# Patient Record
Sex: FEMALE | Race: WHITE | NOT HISPANIC OR LATINO | Employment: FULL TIME | URBAN - METROPOLITAN AREA
[De-identification: names, ages, dates, MRNs, and addresses within clinical notes are randomized per-mention and may not be internally consistent; named-entity substitution may affect disease eponyms.]

---

## 2023-01-19 ENCOUNTER — TELEPHONE (OUTPATIENT)
Dept: HEMATOLOGY/ONCOLOGY | Facility: CLINIC | Age: 48
End: 2023-01-19
Payer: COMMERCIAL

## 2023-01-19 NOTE — TELEPHONE ENCOUNTER
Called and scheduled consult for Friday, March 17th for 11:30am. She voiced thanks and understanding. No questions at this time. Appt added to wait list. Appt letter and copy of new pt questionnaire sent to pt via the mail.    ----- Message from Bharat Patel sent at 1/18/2023  3:06 PM CST -----  Good afternoon,    The pt listed above is being referred from [facility] for (history of stomach cancer in mother/recent removal of 25 mm colon ploy/new dx of ic). I have scanned the referral and records into . Please advise or contact pt to schedule appt at your earliest convenience.    Thank You,    Bharat Patel  [facility]

## 2023-03-13 ENCOUNTER — PATIENT MESSAGE (OUTPATIENT)
Dept: HEMATOLOGY/ONCOLOGY | Facility: CLINIC | Age: 48
End: 2023-03-13
Payer: COMMERCIAL

## 2023-03-16 ENCOUNTER — TELEPHONE (OUTPATIENT)
Dept: HEMATOLOGY/ONCOLOGY | Facility: CLINIC | Age: 48
End: 2023-03-16
Payer: COMMERCIAL

## 2023-03-16 NOTE — TELEPHONE ENCOUNTER
Attempted to call and confirm genetic appt for 3/17 and to possibly offer to switch to a virtual consult with Iftikhar Gilbert since pt lives in [place]. Phone went straight to , left a brief message with my direct call back number.

## 2023-03-16 NOTE — PROGRESS NOTES
"Cancer Genetics  Hereditary and High-Risk Clinic  Department of Hematology and Oncology  [facility]    [facility]    Date of Service:  3/17/23  Visit Provider:  Celio Li DNP  Collaborating Physician:  Hayley Garcia MD    Patient ID  Name: Madeline Colmenares    : 1975    MRN: [id number]      Referring Provider  BOBBY Duncan  [facility]  [address] [place] [address] [address], [place] [address]  Phone:  [phone]  Fax:  [phone]    SUBJECTIVE      Chief Complaint: Genetic Evaluation    History of Present Illness (HPI):  Madeline Colmenares ("Madeline"), 48 y.o., assigned female sex at birth, is new to the [facility] Department of Hematology and Oncology and to me.  She was referred by BOBBY Duncan, with [facility] in [place], [place], for cancer-genetic risk assessment given her recent removal of a 25-mm colon polyp and family history of cancer.    Focused Medical History  Genetic testing:  No  Cancer:  No  Colon polyp:  Yes (has had approx 5-7 colonoscopies in total)  No polyps on colonoscopies prior to 2015 colonoscopy  2015:  No polyps  2022:  25-mm, pedunculated, tubulovillous adenoma in the distal sigmoid colon, s/p resection and retrieval  2022:  Diminutive, sessile, hyperplastic polyp in the sigmoid colon (separate from tattooed spot)  No evidence of the previous polyp  Other pertinent lesion:  No  Pancreatitis or pancreatic cyst:  No  Reproductive organs:  s/p left salpingo-oophorectomy (ovarian cysts), uterus and right ovary intact  Blood disorder:  No    Breast Cancer Risk Assessment Questionnaire  Age:  48 y.o.   Race and ethnicity:  White, No /Latin ethnicity  Weight:  170 lb  Height:  5'6"  Mammographic breast density:  heterogeneously dense   Age at menarche:  15y  Age at first live childbirth:  28y  Menopausal status:  premenopausal  Age at menopause, if applicable:  n/a  Hormone replacement therapy use history:  " previous testosterone use only (shots, pellets)  Breast biopsy history and findings:  never  Thoracic radiation therapy history:  never  Breast cancer susceptibility gene testing history:  See above  [employer] ancestry:  See below  Family history of breast cancer, ovarian cancer, and genetic testing:  See below    Most recent mammogram:  3/2/23 (outside facility):  BI-RADS1, heterogeneously dense breast tissue     Ancestry  Ash[employer] Church:  No    Family Oncologic History  ** The pedigree below was placed into this note in a size that produced a legible font.  If it is appearing small/illegible on your screen, expand this note window horizontally. **    Consanguinity:  No  Hereditary cancer genetic testing in blood relatives:  No  Other than noted, no known history of cancer in relatives depicted in the pedigree or in:  maternal first cousins, maternal extended family, paternal first cousins, paternal extended family.  Other than noted, no known family history of benign tumors or of colon polyps.    Review of Systems  See HPI.    Patient's Distress Score today was 8-9/10 (scale of 0-10 on which 10=worst).  Patient attributes this to health and work ().  Patient denies experiencing suicidal or homicidal ideations (SI/HI).  Patient denies being in any danger.  Offered patient a referral to Behavioral Health, and patient declined.   Pain of 2/10 is not so much pain as it is numbness and tingling.  States this is not painful.  This is to her right leg -- knee to foot -- and to her left leg to a lesser degree of severity and mainly impacting the ankle on that side.  Started since flying to [place] (she just returned from [place] today at 2 AM).  Can fully ambulate, but favors left leg.  Denies swelling of the lower extremities.  Review of stroke symptoms negative:  Denies blurred vision, headache, body weakness, and other neurologic symptoms.  Both arms were tingling, as well, last night -- right > left.   Denies chest pain and shortness of breath.  (Pain is rated on scale of 0-10 on which 10=worst.)     OBJECTIVE     Physical Exam  Very pleasant patient.  Unaccompanied.  Vitals signs:  Reviewed:  Vitals:    03/17/23 1140   BP: 113/78   Pulse: 63   PainSc:   2   PainLoc: Leg   Constitutional      Appearance:  Appears well developed and well nourished. No distress.   Cardiovascular     Heart sounds:  S1 and S2 auscultated.     Lower extremities:  Bilateral DP pulses palpated.  No swelling noted in the lower legs or in the ankles.  Numbness reported on palpation:  Right shin > Right posterior calf > Left lower extremity.  No pain to right lower leg or left lower leg on palpation.  Pulmonary     Effort:  Normal.     Breath sounds:  No abnormality auscultated.  Neurological     Mental Status:  Alert and oriented.     Coordination:  Normal.   Psychiatric         Mood and Affect:  Normal.     Thought Content:  Normal.     Speech:  Normal.     Behavior:  Normal.     Judgment:  Normal.  Genetics-specific     It is my assessment that the patient is ready to proceed with cancer genetic testing from a psychosocial perspective.    CANCER GENETIC COUNSELING      Cancer Genetics     Germline cancer genetic testing is the testing of genes associated with cancer, known as cancer susceptibility genes.  Just as these genes are inherited from parents--one copy of each gene from each parent--mutations in these genes can be inherited, as well.  A mutation in a cancer susceptibility gene adversely affects the gene's ability to prevent cancer; therefore, carriers of cancer susceptibility gene mutations may be at increased risk for certain cancers.     Causes of Cancer    Only approximately 5%-10% of cancers are caused by an inherited cancer susceptibility gene mutation; rather, the majority of cancers are sporadic.  Causes of sporadic cancers may include environmental risk factors, lifestyle risk factors, and non-modifiable risk factors.   It is important to note that members of a family often share not only their genetics but also other risk factors, including environmental and lifestyle risk factors, so cancers can be familial.     Potential Results of Genetic Testing, and Their Implications     Potential results of genetic testing include positive, negative, and variant of unknown significance (VUS).    Positive:  A positive result indicates the presence of at least one clinically significant gene mutation, and the individual's associated cancer risks vary depending upon the cancer susceptibility gene(s) in which there is/are a mutation(s).  With a positive result, depending upon the specific result and the individual's clinical history, modified risk management may be recommended, including measures for risk reduction and/or surveillance; however, there are not always effective strategies for modified risk management.  Negative:  A negative result indicates that no clinically significant mutations were identified in the gene(s) tested.  The ability to interpret the meaning of a negative genetic testing result pertaining to a certain condition is limited in an individual unaffected with that condition -- With regard to colon polyps, Madeline is considered affected, and with regard to other, malignant conditions, she is not.  A negative result in the patient does not indicate that she cannot develop cancer, and, in fact, she may even be at increased risk for cancer based on shared risk factors with affected relatives.   VUS:  A VUS indicates that there is not presently enough data for the laboratory to make a determination as to whether the genetic variant is clinically significant.  VUSs are not typically acted upon clinically.       Genetic Mutation Inheritance     When an individual tests positive for a gene mutation, her first-degree relatives each have a 50% chance of carrying the same mutation, and other, more distant blood relatives can also be  at risk of carrying the same mutation.      Psychological Implication    There is potential for psychological effects related to learning of increased susceptibility to cancer.    Genetic Discrimination     Genetic discrimination occurs when individuals are discriminated against on the basis of their genetic information.    The Genetic Information Nondiscrimination Act of 2008 (ALLYSON) is [place] federal legislation that provides some protections against use of an individual's genetic information by their health insurer and by their employer.      Title I of [place] prohibits most health insurers from utilizing an individual's genetic information to make decisions regarding insurance eligibility or premium charges.  This protection does not apply to health insurance obtained through a job with the [employer], and it is unclear whether it applies to health insurance obtained through the Federal Employees Health Benefits Plan.    Title II of ALL[place]ON prohibits covered entities, in many cases, from requesting or requiring the genetic information of employees and applicants and from using said information to make employment decisions.  This protection does not apply to employers with fewer than 15 employees or to the .    ALL[place]ON does not protect individuals from genetic discrimination by any other type of policy or entity, including but not limited to life insurance, disability insurance, long-term care insurance,  benefits, and [facility] benefits.    Genetic Testing Logistics     An outside laboratory would perform the testing after a blood sample or a skin sample is collected here at the [facility].    There is a potential for the patient to incur out-of-pocket costs related to genetic testing.    One can expect this genetic testing to take approximately three weeks to result.    Post-test genetic counseling can be conducted once the genetic testing results are available.     Cancer Genetics  "Impression    Offered Madeline options of proceeding with hereditary cancer susceptibility gene testing at this time versus delaying/declining such.  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  She has not been worked up for such.  Discussed implications of mastocytosis, with regard to germline genetic testing, with Madeline, and together we concluded that she would first meet with a hematology provider near home (home = [place]) (offered referral to Hematology here at [facility]) to get their opinion on likelihood of mastocytosis and need for workup for mastocytosis.  If the hematologist deems mastocytosis unlikely, we may then proceed with blood sample collection for germline testing; on the other hand, if the hematologist deems further workup for mastocytosis is indicated, will await those results prior to determining most appropriate specimen for germline testing.    Obtaining DVT study for bilat lower legs.    Will send breast cancer risk scores via [facility] to patient after visit.    ASSESSMENT / PLAN        ICD-10-CM ICD-9-CM   1. Encounter for nonprocreative genetic counseling  Z71.83 V26.33   2. Tubulovillous adenoma of colon  D12.6 211.3   3. Family history of colonic polyps  Z83.71 V18.51   4. Numbness and tingling of both legs  R20.0 782.0    R20.2      1. Encounter for nonprocreative genetic counseling  Madeline Colmenares ("Madeline"), 48 y.o., assigned female sex at birth, is new to the [facility] Department of H[facility]ology and Oncology and to me.  She was referred by BOBBY Duncan, with [facility] in [place], [place], for cancer-genetic risk assessment given her recent removal of a 25-mm colon polyp and family history of cancer.  Cancer genetic risk assessment and pre-test cancer genetic counseling were conducted.  Offered Madeline options of " proceeding with hereditary cancer susceptibility gene testing at this time versus delaying/declining such.  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  She has not been worked up for such.  Discussed implications of mastocytosis, with regard to germline genetic testing, with Madeline, and together we concluded that she would first meet with a hematology provider near home (home = [place]) (offered referral to Hematology here at [facility]) to get their opinion on likelihood of mastocytosis and need for workup for mastocytosis.  If the hematologist deems mastocytosis unlikely, we may then proceed with blood sample collection for germline testing; on the other hand, if the hematologist deems further workup for mastocytosis is indicated, will await those results prior to determining most appropriate specimen for germline testing.      2. Tubulovillous adenoma of colon  - See # 1    3. Family history of colonic polyps  - See # 1    4. Numbness and tingling of both legs  - US Lower Extremity Veins Bilateral; Future     Breast Cancer Risk Stratification:  Current, Estimated Breast Cancer Risk Model Used Patient's Score Patient's Risk Category   5-year Starr Model 0.9%  [] N/A given age <35   [x] Average risk (<1.7%)   [] Increased risk (?1.7%)   10-year Tyrer-Cuzick v8.0b 2.6%  [x] <5%   [] ?5% (Chemoprevention recommended if not otherwise contraindicated)   Lifetime (to age 85) Tyrer-Cuzick v8.0b 10.9%  [x] Average risk (<15%)   [] Intermediate risk (?15% - <20%)   [] Increased risk (?20%)     Follow-up:  Follow up in about 1 year (around 3/17/2024) for re-discussion regarding genetic testing, if the patient does not reach out sooner to test.    Questions were encouraged and answered to the patient's satisfaction, and she verbalized understanding of the information and  agreement with the plan.           Approximately 80 minutes were spent face-to-face with the patient.  Approximately 88 minutes in total were spent on this encounter, which includes face-to-face time and non-face-to-face time preparing to see the patient (e.g., review of tests), obtaining and/or reviewing separately obtained history, documenting clinical information in the electronic or other health record, independently interpreting results (not separately reported) and communicating results to the patient/family/caregiver, or care coordination (not separately reported).         Celio Li, TANYA, APRN, FNP-BC, AOCNP, CGRA  Nurse Practitioner, Hereditary and High-Risk Clinic  Department of Hematology and Oncology  [facility]        CC:  JOSUE DuncanC  [facility]  [address], [place] [address]  Phone:  [phone]  Fax:  [phone]

## 2023-03-17 ENCOUNTER — PATIENT MESSAGE (OUTPATIENT)
Dept: HEMATOLOGY/ONCOLOGY | Facility: CLINIC | Age: 48
End: 2023-03-17

## 2023-03-17 ENCOUNTER — OFFICE VISIT (OUTPATIENT)
Dept: HEMATOLOGY/ONCOLOGY | Facility: CLINIC | Age: 48
End: 2023-03-17
Payer: COMMERCIAL

## 2023-03-17 ENCOUNTER — HOSPITAL ENCOUNTER (OUTPATIENT)
Dept: RADIOLOGY | Facility: HOSPITAL | Age: 48
Discharge: HOME | End: 2023-03-17
Attending: NURSE PRACTITIONER
Payer: COMMERCIAL

## 2023-03-17 VITALS — SYSTOLIC BLOOD PRESSURE: 113 MMHG | HEART RATE: 63 BPM | DIASTOLIC BLOOD PRESSURE: 78 MMHG

## 2023-03-17 DIAGNOSIS — Z71.83 ENCOUNTER FOR NONPROCREATIVE GENETIC COUNSELING: Primary | ICD-10-CM

## 2023-03-17 DIAGNOSIS — R20.0 NUMBNESS AND TINGLING OF BOTH LEGS: ICD-10-CM

## 2023-03-17 DIAGNOSIS — D12.6 TUBULOVILLOUS ADENOMA OF COLON: ICD-10-CM

## 2023-03-17 DIAGNOSIS — Z83.719 FAMILY HISTORY OF COLONIC POLYPS: ICD-10-CM

## 2023-03-17 DIAGNOSIS — R20.2 NUMBNESS AND TINGLING OF BOTH LEGS: ICD-10-CM

## 2023-03-17 PROBLEM — J32.9 RECURRENT SINUS INFECTIONS: Status: ACTIVE | Noted: 2023-03-17

## 2023-03-17 PROBLEM — N30.10 INTERSTITIAL CYSTITIS: Status: ACTIVE | Noted: 2023-03-17

## 2023-03-17 PROBLEM — E06.3 HASHIMOTO'S THYROIDITIS: Status: ACTIVE | Noted: 2023-03-17

## 2023-03-17 PROCEDURE — 99205 PR OFFICE/OUTPT VISIT, NEW, LEVL V, 60-74 MIN: ICD-10-PCS | Mod: S$GLB,,, | Performed by: NURSE PRACTITIONER

## 2023-03-17 PROCEDURE — 1159F MED LIST DOCD IN RCRD: CPT | Mod: CPTII,S$GLB,, | Performed by: NURSE PRACTITIONER

## 2023-03-17 PROCEDURE — 3078F PR MOST RECENT DIASTOLIC BLOOD PRESSURE < 80 MM HG: ICD-10-PCS | Mod: CPTII,S$GLB,, | Performed by: NURSE PRACTITIONER

## 2023-03-17 PROCEDURE — 93970 EXTREMITY STUDY: CPT | Mod: 26,,, | Performed by: STUDENT IN AN ORGANIZED HEALTH CARE EDUCATION/TRAINING PROGRAM

## 2023-03-17 PROCEDURE — 99999 PR PBB SHADOW E&M-EST. PATIENT-LVL III: CPT | Mod: PBBFAC,,, | Performed by: NURSE PRACTITIONER

## 2023-03-17 PROCEDURE — 99205 OFFICE O/P NEW HI 60 MIN: CPT | Mod: S$GLB,,, | Performed by: NURSE PRACTITIONER

## 2023-03-17 PROCEDURE — 3074F SYST BP LT 130 MM HG: CPT | Mod: CPTII,S$GLB,, | Performed by: NURSE PRACTITIONER

## 2023-03-17 PROCEDURE — 93970 EXTREMITY STUDY: CPT | Mod: TC

## 2023-03-17 PROCEDURE — 3074F PR MOST RECENT SYSTOLIC BLOOD PRESSURE < 130 MM HG: ICD-10-PCS | Mod: CPTII,S$GLB,, | Performed by: NURSE PRACTITIONER

## 2023-03-17 PROCEDURE — 99999 PR PBB SHADOW E&M-EST. PATIENT-LVL III: ICD-10-PCS | Mod: PBBFAC,,, | Performed by: NURSE PRACTITIONER

## 2023-03-17 PROCEDURE — 1159F PR MEDICATION LIST DOCUMENTED IN MEDICAL RECORD: ICD-10-PCS | Mod: CPTII,S$GLB,, | Performed by: NURSE PRACTITIONER

## 2023-03-17 PROCEDURE — 3078F DIAST BP <80 MM HG: CPT | Mod: CPTII,S$GLB,, | Performed by: NURSE PRACTITIONER

## 2023-03-17 PROCEDURE — 93970 US LOWER EXTREMITY VEINS BILATERAL: ICD-10-PCS | Mod: 26,,, | Performed by: STUDENT IN AN ORGANIZED HEALTH CARE EDUCATION/TRAINING PROGRAM

## 2023-03-17 RX ORDER — LEVOTHYROXINE, LIOTHYRONINE 38; 9 UG/1; UG/1
60 TABLET ORAL EVERY MORNING
COMMUNITY
Start: 2023-03-03 | End: 2023-09-21

## 2023-03-17 RX ORDER — LEVOTHYROXINE, LIOTHYRONINE 57; 13.5 UG/1; UG/1
TABLET ORAL
COMMUNITY
Start: 2023-02-28 | End: 2023-09-21

## 2023-03-17 RX ORDER — HYDROXYZINE PAMOATE 25 MG/1
25 CAPSULE ORAL 2 TIMES DAILY
COMMUNITY
Start: 2023-02-27

## 2023-03-19 ENCOUNTER — PATIENT MESSAGE (OUTPATIENT)
Dept: HEMATOLOGY/ONCOLOGY | Facility: CLINIC | Age: 48
End: 2023-03-19
Payer: COMMERCIAL

## 2023-03-19 DIAGNOSIS — R20.0 NUMBNESS AND TINGLING OF LOWER EXTREMITY: Primary | ICD-10-CM

## 2023-03-19 DIAGNOSIS — R20.2 NUMBNESS AND TINGLING OF LOWER EXTREMITY: Primary | ICD-10-CM

## 2023-03-21 ENCOUNTER — DOCUMENTATION ONLY (OUTPATIENT)
Dept: HEMATOLOGY/ONCOLOGY | Facility: CLINIC | Age: 48
End: 2023-03-21
Payer: COMMERCIAL

## 2023-03-22 ENCOUNTER — PATIENT MESSAGE (OUTPATIENT)
Dept: HEMATOLOGY/ONCOLOGY | Facility: CLINIC | Age: 48
End: 2023-03-22
Payer: COMMERCIAL

## 2023-03-22 PROBLEM — D72.829 ELEVATED WBCS: Status: ACTIVE | Noted: 2023-03-22

## 2023-03-23 ENCOUNTER — PATIENT MESSAGE (OUTPATIENT)
Dept: HEMATOLOGY/ONCOLOGY | Facility: CLINIC | Age: 48
End: 2023-03-23
Payer: COMMERCIAL

## 2023-03-23 PROBLEM — R20.0 NUMBNESS AND TINGLING OF BOTH LEGS: Status: ACTIVE | Noted: 2023-03-23

## 2023-03-23 PROBLEM — D12.6 TUBULOVILLOUS ADENOMA OF COLON: Status: ACTIVE | Noted: 2023-03-23

## 2023-03-23 PROBLEM — R20.2 NUMBNESS AND TINGLING OF BOTH LEGS: Status: ACTIVE | Noted: 2023-03-23

## 2023-03-23 NOTE — TELEPHONE ENCOUNTER
Neurology referral sent to [facility] per request.  
hard copy, drawn during this pregnancy

## 2023-03-27 ENCOUNTER — TELEPHONE (OUTPATIENT)
Dept: HEMATOLOGY/ONCOLOGY | Facility: CLINIC | Age: 48
End: 2023-03-27
Payer: COMMERCIAL

## 2023-03-27 ENCOUNTER — PATIENT MESSAGE (OUTPATIENT)
Dept: HEMATOLOGY/ONCOLOGY | Facility: CLINIC | Age: 48
End: 2023-03-27
Payer: COMMERCIAL

## 2023-03-27 NOTE — TELEPHONE ENCOUNTER
Discussed symptomatology, including today's updates from patient, with collaborating physician Dr. Garcia, who agrees with me that pt should go to ER given symptoms and no recent serum electrolyte levels found in Care Everywhere.  Dr. Garcia also adds that pt may need an MRI.  Phoned pt to communicate this information to her; however, no answer.  LVM for pt, letting her know to check her [facility] message as I would be communicating the information I was calling about through there, and asked her to respond to me there.

## 2023-04-03 ENCOUNTER — PATIENT MESSAGE (OUTPATIENT)
Dept: HEMATOLOGY/ONCOLOGY | Facility: CLINIC | Age: 48
End: 2023-04-03
Payer: COMMERCIAL

## 2023-04-03 PROBLEM — T14.8XXA: Status: ACTIVE | Noted: 2023-03-31

## 2023-04-03 PROBLEM — R79.89 ELEVATED SERUM HCG: Status: ACTIVE | Noted: 2023-03-29

## 2023-04-03 PROBLEM — G57.00 COMPRESSION OF COMMON PERONEAL NERVE: Status: ACTIVE | Noted: 2023-03-31

## 2023-04-04 PROBLEM — R79.89 LOW SERUM CREATININE: Status: ACTIVE | Noted: 2023-04-04

## 2023-04-04 PROBLEM — R79.89 ABNORMAL BUN-TO-CREATININE RATIO: Status: ACTIVE | Noted: 2023-04-04

## 2023-04-04 PROBLEM — R74.01 ELEVATED ALANINE AMINOTRANSFERASE (ALT) LEVEL: Status: ACTIVE | Noted: 2023-04-04

## 2023-04-18 ENCOUNTER — PATIENT MESSAGE (OUTPATIENT)
Dept: HEMATOLOGY/ONCOLOGY | Facility: CLINIC | Age: 48
End: 2023-04-18
Payer: COMMERCIAL

## 2023-04-20 ENCOUNTER — PATIENT MESSAGE (OUTPATIENT)
Dept: HEMATOLOGY/ONCOLOGY | Facility: CLINIC | Age: 48
End: 2023-04-20
Payer: COMMERCIAL

## 2023-04-20 DIAGNOSIS — Z80.49 FAMILY HISTORY OF UTERINE CANCER: ICD-10-CM

## 2023-04-20 DIAGNOSIS — Z80.9 FAMILY HISTORY OF CANCER: ICD-10-CM

## 2023-04-20 DIAGNOSIS — D12.6 TUBULOVILLOUS ADENOMA OF COLON: Primary | ICD-10-CM

## 2023-04-20 DIAGNOSIS — Z83.719 FAMILY HISTORY OF COLONIC POLYPS: ICD-10-CM

## 2023-05-01 ENCOUNTER — PATIENT MESSAGE (OUTPATIENT)
Dept: HEMATOLOGY/ONCOLOGY | Facility: CLINIC | Age: 48
End: 2023-05-01
Payer: COMMERCIAL

## 2023-06-01 ENCOUNTER — PATIENT MESSAGE (OUTPATIENT)
Dept: HEMATOLOGY/ONCOLOGY | Facility: CLINIC | Age: 48
End: 2023-06-01
Payer: COMMERCIAL

## 2023-06-05 ENCOUNTER — TELEPHONE (OUTPATIENT)
Dept: ENDOSCOPY | Facility: HOSPITAL | Age: 48
End: 2023-06-05
Payer: COMMERCIAL

## 2023-06-05 ENCOUNTER — OFFICE VISIT (OUTPATIENT)
Dept: HEMATOLOGY/ONCOLOGY | Facility: CLINIC | Age: 48
End: 2023-06-05
Payer: COMMERCIAL

## 2023-06-05 ENCOUNTER — PATIENT MESSAGE (OUTPATIENT)
Dept: HEMATOLOGY/ONCOLOGY | Facility: CLINIC | Age: 48
End: 2023-06-05

## 2023-06-05 ENCOUNTER — PATIENT MESSAGE (OUTPATIENT)
Dept: HEMATOLOGY/ONCOLOGY | Facility: CLINIC | Age: 48
End: 2023-06-05
Payer: COMMERCIAL

## 2023-06-05 ENCOUNTER — PATIENT MESSAGE (OUTPATIENT)
Dept: ENDOSCOPY | Facility: HOSPITAL | Age: 48
End: 2023-06-05
Payer: COMMERCIAL

## 2023-06-05 DIAGNOSIS — Q99.8: Chronic | ICD-10-CM

## 2023-06-05 DIAGNOSIS — Z71.83 ENCOUNTER FOR NONPROCREATIVE GENETIC COUNSELING: Primary | ICD-10-CM

## 2023-06-05 DIAGNOSIS — Z86.010 HISTORY OF COLON POLYPS: Primary | ICD-10-CM

## 2023-06-05 PROCEDURE — 99215 OFFICE O/P EST HI 40 MIN: CPT | Mod: 95,,, | Performed by: NURSE PRACTITIONER

## 2023-06-05 PROCEDURE — 99215 PR OFFICE/OUTPT VISIT, EST, LEVL V, 40-54 MIN: ICD-10-PCS | Mod: 95,,, | Performed by: NURSE PRACTITIONER

## 2023-06-05 NOTE — TELEPHONE ENCOUNTER
Reference:  National Comprehensive Cancer Network (NCCN). (2023). Colorectal cancer screening. NCCN Clinical Practice Guidelines in Oncology (NCCN Guidelines), Version 1.2023.

## 2023-06-05 NOTE — PROGRESS NOTES
"Cancer Genetics  Hereditary and High-Risk Clinic  Department of Hematology and Oncology  [facility]    Date of Service:  23  Visit Provider:  Celio Li DNP  Collaborating Physician:  Hayley Garcia MD    Patient ID  Name: Madeline Colmenares    : 1975    MRN: [id number]      Televisit Information  The patient location is: [place], [place].    The chief complaint leading to consultation is:  As below.    Visit type: audiovisual.    Face-to-face time with patient:  Approximately 32 minutes.    Approximately 60 minutes in total were spent on this encounter, which includes face-to-face time and non-face-to-face time preparing to see the patient (e.g., review of tests), obtaining and/or reviewing separately obtained history, documenting clinical information in the electronic or other health record, independently interpreting results (not separately reported) and communicating results to the patient/family/caregiver, or care coordination (not separately reported).  Each patient to whom he or she provides medical services by telemedicine is:  (1) informed of the relationship between the physician and patient and the respective role of any other health care provider with respect to management of the patient; and (2) notified that he or she may decline to receive medical services by telemedicine and may withdraw from such care at any time.  Notes:  As below.    SUBJECTIVE      Chief Complaint: Results    History of Present Illness (HPI):  Madeline Colmenares ("Madeline"), 48 y.o., assigned female sex at birth, initially presented on 3/17/23 for cancer-genetic risk assessment and subsequently underwent germline (ie, hereditary) cancer-genetic testing.  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  She returns today for post-test genetic counseling.    Germline (Hereditary) Cancer Susceptibility Gene Testing  See partial report in Assessment/Plan below.  " "Complete report will be Epic.    Focused Medical History  Cancer:  No  Colon polyp:  Yes (has had approx 5-7 colonoscopies in total)  No polyps on colonoscopies prior to 09/16/2015 colonoscopy  09/16/2015:  No polyps  09/26/2022:  25-mm, pedunculated, tubulovillous adenoma in the distal sigmoid colon, s/p resection and retrieval  11/16/2022:  Diminutive, sessile, hyperplastic polyp in the sigmoid colon (separate from tattooed spot)  No evidence of the previous polyp     Other pertinent lesion:  No  Pancreatitis or pancreatic cyst:  No; however, while in college patient had had pain for several months and therefore underwent testing, which she believes included testing of her pancreas, but a solid diagnosis was not obtained, and then the pain just resolved  Reproductive organs:  s/p left salpingo-oophorectomy (ovarian cysts), uterus and right ovary intact  Blood disorder:  No (tested negative for mastocytosis recently)    Patient states she is planning for 11/2023 colonoscopy with her established GI provider.    Breast Cancer Risk Assessment Questionnaire  Age:  48 y.o.   Race and ethnicity:  White, No /Latin ethnicity  Weight:  170 lb  Height:  5'6"  Mammographic breast density:  heterogeneously dense   Age at menarche:  15y  Age at first live childbirth:  28y  Menopausal status:  premenopausal  Age at menopause, if applicable:  n/a  Hormone replacement therapy use history:  previous testosterone use only (shots, pellets)  Breast biopsy history and findings:  never  Thoracic radiation therapy history:  never     Most recent mammogram:  3/2/23 (outside facility):  BI-RADS1, heterogeneously dense breast tissue     Ancestry  Ashkenazi Hinduism:  No    Family Oncologic History  ** The pedigree below was placed into this note in a size that produced a legible font.  If it is appearing small/illegible on your screen, expand this note window horizontally. **    Consanguinity:  No  Hereditary cancer genetic testing in " blood relatives:  No  Other than noted, no known history of cancer in relatives depicted in the pedigree or in:  maternal first cousins, maternal extended family, paternal first cousins, paternal extended family.  Other than noted, no known family history of pancreatitis, of benign tumors, or of colon polyps.      Review of Systems  See HPI.       OBJECTIVE     Physical Exam  Significantly limited secondary to the inherent nature of a virtual visit.  Very pleasant patient.  Constitutional      Appearance:  Appears well developed and well nourished. No distress.   Neurological     Mental Status:  Alert and oriented.  Psychiatric         Mood and Affect:  Normal.     Thought Content:  Normal.     Speech:  Normal.     Behavior:  Normal.     Judgment:  Normal.  Genetics-specific     It is my assessment that the patient is ready to proceed with cancer genetic testing from a psychosocial perspective.    ASSESSMENT / PLAN      Germline (Hereditary) Cancer Susceptibility Gene Testing      Complete report will be provided to the patient and will also be available in Epic under the Labs/Media tab(s).  Patient was advised of her genetic test results on 6/1/23.       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       SPINK1 is a cancer susceptibility gene that, when functioning normally, codes for a protein known as a trypsin inhibitor, which is secreted from pancreatic acinar cells into pancreatic juice and is thought to work to prevent premature activation, caused by trypsin, of zymogens in the pancreas and pancreatic duct.  Pathogenic/likely pathogenic variants in SPINK1 are associated with certain conditions, detailed below.     As a heterozygous carrier of a pathogenic SPINK1 variant, Madeline is predisposed to autosomal-dominantly inherited conditions associated  with SPINK1 and is considered a carrier of autosomal-recessively inherited conditions associated with SPINK1.  Individuals have two copies of every gene--a copy from each biological parent.  One mutated copy of a gene is sufficient to predispose an individual to autosomal-dominantly inherited conditions associated with that gene.  Both copies of the gene need to be mutated in order for the individual to develop autosomal-recessively inherited conditions associated with that gene.  When both reproductive partners have one mutated copy of their SPINK1 gene, each offspring of theirs has a 25% (1 in 4) chance of inherited two mutated copies and being susceptible to the autosomal-recessively inherited SPINK1 risks detailed below.     SPINK1 autosomal-dominantly inherited risks:  Familial pancreatitis, which in turn increases risk of developing pancreatic cancer.  Estimated cumulative risk of pancreatic cancer in 40%-53% by age 70-75, which is a 26- to 87-fold increased risk as compared to the general population.   Pathogenic SPINK1 variants are fairly common in the general population, with about 2% of healthy individuals carrying a high-risk variant; however, <1% of carriers develop pancreatitis.  Nonetheless, carriers do have an approximately 12-fold increased risk of developing pancreatitis over the general population.  The clinical significance of carrying a pathogenic SPINK1 variant is unclear when such a variant is identified in an individual lacking a clinical history of pancreatitis.     SPINK1 autosomal-recessively inherited risks:  Familial pancreatitis.  Early-onset, aggressive pancreatitis in individuals who harbor the p.N34S variant on both copies of their SPINK1 gene.     SPINK1 risk management for Madeline:  Consider pancreatic cancer screening at age 40 or at 20 years after onset of pancreatitis, whichever is earlier.  Madeline will be referred to the [facility] for  "evaluation and management.  The ability to prevent the primary manifestations of pancreatitis is limited, but there are certain lifestyle modifications that may help to prevent attacks of acute pancreatitis, including eating a diet low in fat, eating multiple small meals a day, maintaining appropriate hydration, and avoiding alcohol consumption and smoking -- unless any of these interventions is otherwise contraindicated.  Again, Madeline will be referred to the [facility] for evaluation and management.  Counseling for reproductive options, which may include prenatal genetic diagnosis and/or assisted reproduction with preimplantation genetic testing, is indicated for individuals/couples expressing concern over future offspring's SPINK1-associated risks.  If Madeline is interested in this, she can let me know at any time, at which point I would refer her to the appropriate specialty.     SPINK1 risks to relatives:  It is believed that this pathogenic SPINK1 variant was inherited from a parent of Whitney.    Biological children and siblings of the pathogenic SPINK1 variant carrier each independently have a 50% chance of inheriting or having inherited the same SPINK1 variant, thereby also having an inherited predisposition to the dominantly inherited SPINK1-associated conditions.  More distant relatives may also be at risk for carrying the family SPINK1 variant.  Both males and females can carry, be affected by, and pass down this type of genetic variant.  When passed down, these variants are passed directly from parent to child and do not "skip generations."  Relatives of DEAN may also be at risk for carrying a pathogenic/likely pathogenic variant on both copies of their SPINK1 gene, which would predispose that individual to the recessively inherited SPINK1-associated conditions.  Relatives of Whitney on both sides of her family should strongly consider meeting with a genetic " "specialist to discuss undergoing testing for the family pathogenic SPINK1 variant.  Nowadays, such testing is often quite affordable.  If a relative of Camdens tests positive for the family pathogenic SPINK1 variant, their management at this time may possibly change in the following ways but ultimately would be determined by that individual and their healthcare provider (this is not to be considered medical advice):    Screening and/or lifestyle modifications with regard to the pancreas.  Counseling for reproductive options, which may include prenatal genetic diagnosis and/or assisted reproduction with preimplantation genetic testing, is indicated for individuals/couples expressing concern over future offspring's SPINK1-associated risks.   A "SPINK1 Family Notification Letter" has been sent to Madeline through her [facility] portal for her to share with her relatives.    With regard to your own diagnosis of colon polyps, the negative (normal) results in the associated genes represent a true-negative (or an informative) result, meaning that your colon polyps were more likely to have been sporadic than hereditary; however, these genetic testing results reduce--but do not eliminate--the possibility of previously diagnosed cancers of yours having been hereditary or the possibility of your developing a hereditary cancer in the future.    Your negative results for genes associated with cancers/other conditions in your relatives, with which you have not personally been affected, are uninformative.  In other words, your affected relatives' cancers/other conditions may have been hereditary or may have been sporadic, and without knowing that information, your negative results in the associated genes only tell us that you likely don't have a hereditary predisposition to those cancers/other conditions; however, you can still develop those cancers/other conditions and in fact may even have an increased risk for them based in part on " your family history.  If the appropriate, affected relatives are found to carry a pathogenic (harmful) variant that explains their cancers/other conditions, and you do not carry that variant, your negative results would then be considered a true-negative.      References:  [facility] (2017). SERINE PROTEASE INHIBITOR, KAZAL-TYPE, 1; SPINK1. Online Mendelian Inheritance of Man (OMIM). Retrieved on June 5, 2023, from https://www.omim.org/entry/583340?search=spink1&highlight=spink1  MedlinePlus [Internet]. [place] ([place]): National Library of Medicine ([place]); [updated 2012 Oct 1]. Hereditary pancreatitis; [cited 2023 Jun 6]. Available from: https://medlineplus.gov/genetics/condition/hereditary-pancreatitis  National Comprehensive Cancer Network (NCCN). (2023). Genetic/familial high-risk assessment: Breast, ovarian, and pancreatic. NCCN Clinical Practice Guidelines in Oncology (NCCN Guidelines), Version 3.2023.  National Comprehensive Cancer Network (NCCN). (2023). Pancreatic adenocarcinoma. NCCN Clinical Practice Guidelines in Oncology (NCCN Guidelines), Version 1.2023.  National [place] [facility] (NIH). SPINK1 serine peptidase inhibitor Kazal type 1 [ Homo sapiens (human) ]. Retrieved on June 5, 2023, from https://www.ncbi.nlm.nih.gov/gene/6690?_ga=[email or web address]  Jack C, Monie J, Yessenia COTTO. Pancreatitis Overview. 2014 Mar 13 [Updated 2020 Jul 2]. In: Sabas EDUARDO, Shad CARD, Sudeep RA, et al., editors. GeneReviews® [Internet]. [place] ([place]): [facility], [place]; 1753-1807. Available from: https://www.ncbi.nlm.nih.gov/books/INS499456/  DIXON Casas (May 9, 2023). Hereditary pancreatitis. In JENNIFER Burrell & CASSANDRA Cazares (Eds.), UpToDate.       Option of Additional Gene Testing    Can consider testing the RNF43 gene through a different laboratory.  This would consist of DNA analysis.  RNF43 is a gene that can be associated with development of colon  "polyps and has limited evidence of association with colorectal cancer.  You opted into testing this today.  I will send you the consent form, and once received back from you, I will order the saliva kit for home delivery.    You opted out of including leukemia susceptibility genes on this additional gene testing.    Health Maintenance / Cancer Risks and Risk Management    Only a small percentage (approximately 5%-10%) of cancers are hereditary, meaning caused by an inherited gene mutation; rather, most cancers are sporadic.  Environmental factors, lifestyle factors, and even factors beyond our control play a significant role in the development of many cancers.  As such, an individual can develop cancer even with negative genetic testing.  Furthermore, even with negative genetic testing, you can still be at increased risk for certain cancers, as you may independently have risk factors for those cancers and/or you may share risk factors with your family members affected by cancer.  For these reasons, it is strongly recommended that you ensure that your healthcare providers are aware of and up-to-date on your personal and family history so that your medical management including cancer screenings can be based in part off of this information.      It is recommended that you be established with a primary care provider (PCP), whom you should see at least annually for routine health maintenance, including cancer screenings.  If you are not established with a PCP, please contact me so I can refer you.    There are models that help us to "calculate" your breast cancer risk.  I used the Starr model to calculate your 5-year risk and the Tyrer-Cuzick model, version 8.0b, to calculate your 10-year and lifetime (to age 85) risks.   Your current, estimated risks for developing breast cancer are as follows:  5-year 0.9% (average risk); 10-year 2.6% (does not reach the threshold at which risk-reducing medication is recommended unless " otherwise contraindicated); lifetime (to age 85) 10.9% (average risk).  The below recommendations are made based in part on these risk scores.    The following cancer screenings are currently recommended for you (please note that these recommendations can change based on updates to clinical guidelines and/or with changes to your medical or family history and are therefore only considered accurate as of the date on which this letter was composed; additional and/or alternative screenings may be recommended by your healthcare providers, and recommendations from your healthcare providers directly managing these risks supersede the below recommendations):  Breast cancer:  Ongoing breast cancer risk assessment (as your breast cancer risk continually changes) and breast cancer risk-reduction counseling.  I (Celio Li NP) would be happy to perform this any time you reach out to me and request such.  Ongoing breast awareness, meaning you are familiar with your breasts, perform breast self-exams at least monthly, and promptly report changes/concerns to your gynecology provider or, if you have one, your breast specialist.  Clinical breast exam (CBE) every 12 months with your gynecology provider.  Annual mammogram with tomosynthesis (ie, 3-D mammography).  Consideration of annual breast MRI with contrast, usually performed around the 6-month cadence between annual mammography, given your mammographically dense breast tissue -- Today, we discussed risks, benefits, and limitations of breast MRI, with risks discussion including potential for gadolinium retention and implications of such (Today, you expressed that you would reach out to me if you wish to undergo breast MRI).  Gynecologic cancers:  Annual visit with your gynecology provider, which may include pelvic exam and/or Pap smear/HPV testing.  Colorectal cancer:  Colonoscopy as recommended by your gastroenterology (GI) provider; please follow up with your GI provider about  when your next colonoscopy is due.  Other reasons you may need more frequent colonoscopies include symptoms and/or family history.  Promptly make your GI provider aware of any symptoms you may experience.  With regard to family history, if a first-degree relative has a colorectal polyp history including any of the following characteristics, please let me know:  Adenoma with high-grade dysplasia, adenoma or sessile serrated polyp/adenoma 1 cm or larger in size, villous or tubulovillous adenoma, traditional serrated adenoma (TSA), sessile serrated lesion/polyp/adenoma with any dysplasia, cumulatively 10 or more polyps.  Additionally, please let me know if you learn moving forward that a first-, second-, or third-degree relative has had or is diagnosed with colorectal cancer.  Skin cancer:  Annual total-body skin examination (TBSE) with your dermatology provider.    If you are not established with any of the above healthcare specialist, please let me know so I can refer you.    Please continue to follow up with all healthcare providers as they have indicated.    Some information regarding general cancer risk reduction:  It is recommended to avoid tobacco use; be physically active; maintain a healthy weight; eat a diet rich in fruits, vegetables, and whole grains and low in saturated/trans fat, red meat, and processed meat; limit alcohol consumption (zero is best); protect against sexually transmitted infections; protect against the sun (by minimizing ultraviolet (UV) exposure, wearing UV-protective clothing, and using high-SPF sunblock), and avoid tanning beds; and get regular screenings for cancers as recommended by your healthcare team.    Regarding Your Relatives    Your relatives also may be at increased risk for certain cancers, depending upon their own personal and family history; therefore, it is important that they, too, ensure that their own healthcare providers are aware of and up-to-date on their personal  and family history so that their medical management including cancer screenings can be based in part off of this information.      Additionally, it is possible for your relatives to have hereditary cancer susceptibility gene mutations even when you have negative genetic testing.      Your relatives should speak with a healthcare provider about their cancer risks and whether genetic counseling and potentially testing may be indicated for them.  Anyone interested in pursuing cancer genetic counseling/testing may contact the [facility] at [phone] to schedule an appointment or may visit [email or web address] to locate a genetic specialist near them.    Cancer Genetics Follow-up    Moving forward, please update me with any changes to--or new information learned about--your personal or family history and with any relative's genetic testing results.  Barring any such changes, please follow up with me in 2 years.    In the meantime, please don't hesitate to reach out with any questions or concerns.            Diagnoses and Orders for This Encounter:    ICD-10-CM ICD-9-CM   1. Encounter for nonprocreative genetic counseling  Z71.83 V26.33   2. Monoallelic mutation of SPINK1 gene  Q99.8 758.5     1. Encounter for nonprocreative genetic counseling    2. Monoallelic mutation of SPINK1 gene  - Ambulatory referral/consult to Gastroenterology; Future           Follow-up:  Follow up in about 2 years (around 6/5/2025).    Questions were encouraged and answered to the patient's satisfaction, and she verbalized understanding of the information and agreement with the plan.               Celio Li, DNP, APRN, FNP-BC, AOCNP, CGRA  Nurse Practitioner, Hereditary and High-Risk Clinic  Department of Hematology and Oncology  [facility]        CC:  Curry Webber, JIM-C  [facility]  [address], [place] [address]  Phone:  [phone]  Fax:  [phone]

## 2023-06-05 NOTE — Clinical Note
Please contact pt to schedule visit in [facility] clinic given her newly diagnosed SPINK1 gene mutation.  Referral is in.  Thanks! Celio Li NP Cancer Genetics

## 2023-06-05 NOTE — Clinical Note
Nilam, can you please scan in the [facility] report to [facility] and also send pt an e-copy?  Thanks!

## 2023-06-05 NOTE — TELEPHONE ENCOUNTER
Reference:  National Center for Biotechnology Information. ClinVar; [[id number]], https://www.ncbi.nlm.nih.gov/clinvar/variation/KQP408704067.61 (accessed June 5, 2023).

## 2023-06-05 NOTE — LETTER
"     2023    Madeline Colmenares  [address] [place] [address]         To:         Madeline Colmenares (1975)  Jose Cruz:   Madeline, please read the letter attached, and let me know if any questions/concerns; if no questions/concerns, please disseminate the letter to your relatives.     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  SPINK1 is a cancer susceptibility/predisposition gene, which, when harboring a pathogenic (or a likely pathogenic) variant, does not work as expected to stop the associated conditions from developing.  There are important implications for Madeline's relatives.      General information on Madeline's genetic test, the SPINK1 gene, and management associated with the SPINK1 gene is provided below; however, this is not to be considered medical advice, and you are encouraged to discuss this information with your own healthcare providers and genetic specialists.      You are encouraged, also, to provide this letter to your healthcare providers and genetic specialists, as it contains pertinent information related to your potential genetic testing.      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  Estimated cumulative risk of pancreatic cancer in 40%-53% by age 70-75, which is a 26- to 87-fold increased risk as compared to the general population.  Pathogenic SPINK1 variants are fairly common in the general population, with about 2% of healthy individuals carrying a high-risk variant; however, <1% of carriers develop pancreatitis.  Nonetheless, carriers do have an approximately 12-fold increased risk of developing pancreatitis over the general population.   Autosomal-recessively inherited risk, meaning that both copies of the SPINK1 gene need to harbor a pathogenic or likely pathogenic variant for the risk to apply: Familial pancreatitis.    Early-onset, aggressive pancreatitis in individuals who harbor the p.N34S variant on both copies of their SPINK1 gene.       Implications for Whitney relatives:    It is believed that this pathogenic SPINK1 variant was inherited from a parent of Whitney.    Biological children and siblings of the pathogenic SPINK1 variant carrier each independently have a 50% chance of inheriting or having inherited the same SPINK1 variant, thereby also having an inherited predisposition to the dominantly inherited SPINK1-associated conditions.  More distant relatives may also be at risk for carrying the family SPINK1 variant.  Both males and females can carry, be affected by, and pass down this type of genetic variant.  When passed down, these variants are passed directly from parent to child and do not "skip generations."  Relatives of DEAN may also be at risk for carrying a pathogenic/likely pathogenic variant on both copies of their SPINK1 gene, which would predispose that individual to the recessively inherited SPINK1-associated conditions.  Relatives of Whitney on both sides of her family should strongly consider meeting with a genetic specialist to discuss undergoing testing for " the family pathogenic SPINK1 variant.  Nowadays, such testing is often quite affordable.  If a relative of Whitney tests positive for the family pathogenic SPINK1 variant, their management at this time may possibly change in the following ways but ultimately would be determined by that individual and their healthcare provider (this is not to be considered medical advice):    Screening and/or lifestyle modifications with regard to the pancreas.  Counseling for reproductive options, which may include prenatal genetic diagnosis and/or assisted reproduction with preimplantation genetic testing, is indicated for individuals/couples expressing concern over future offspring's SPINK1-associated risks.      How to obtain genetic counseling/testing:    It is recommended that relatives of Whitney meet with a genetics professional for genetic counseling and potentially testing, for their own health purposes and potentially also for reproductive purposes.    For individuals located in [place]:    Cancer Genetics:  [facility], phone number [phone].    Reproductive Genetics:  [facility], phone number [phone].  For individuals located outside of [place]:    Cancer Genetics:  Phone [facility] at [phone] to determine if there is a provider who can see you virtually.  Reproductive Genetics:  Phone [facility] at [phone] to determine if there is a provider who can see you virtually.  For cancer and/or reproductive genetics purposes, genetic professionals nearest to your location can be located by visiting [email or web address].    [facility] offers complimentary family SPINK1 variant testing to Madeline's blood relatives, so long as the test is completed by August 30, 2023.  The relative should bring this letter to their pre-test appointment as it contains information necessary for participation in the family variant testing program.   If your healthcare provider needs assistance in ordering the test, they can call [facility] at [phone].  Please note that only the single-gene test is complimentary, while the associated office visits are billable.    Of note, Madeline's relatives can carry pathogenic or likely pathogenic variants in addition to and/or other than those identified in Madeline, which is one reason why it's important to first meet with a genetics professional prior to undergoing genetic testing.     VERY IMPORTANT:  Please let Madeline know of your genetic testing results so that she can inform me and I can then provide further guidance for the family.     Please don't hesitate to call with questions pertaining to logistics of your testing, etc.                                                                                                                         Sincerely,          Celio Li, TANYA, APRN, FNP-BC, AOCNP, CGRA  Nurse Practitioner, Hereditary and High-Risk Clinic  Department of Hematology and Oncology  [facility]    Phone:  [phone]

## 2023-06-05 NOTE — Clinical Note
Jaylin,  I believe you have access to this patient's genetics results report on the [facility] portal.  Can you please link it to the 4[place]20/23 [place] [facility] [place] I placed in Epic?  Thank you! Celio Li NP Cancer Genetics

## 2023-06-06 ENCOUNTER — PATIENT MESSAGE (OUTPATIENT)
Dept: HEMATOLOGY/ONCOLOGY | Facility: CLINIC | Age: 48
End: 2023-06-06
Payer: COMMERCIAL

## 2023-06-06 NOTE — TELEPHONE ENCOUNTER
----- Message from Zohreh Steele sent at 6/5/2023  3:58 PM CDT -----  Contact: [phone]  OPAL JORGE calling regarding Appointment Access (message) for #Pancreaticobiliary Service

## 2023-06-27 ENCOUNTER — LAB VISIT (OUTPATIENT)
Dept: LAB | Facility: HOSPITAL | Age: 48
End: 2023-06-27
Attending: NURSE PRACTITIONER
Payer: COMMERCIAL

## 2023-06-27 DIAGNOSIS — Z80.49 FAMILY HISTORY OF UTERINE CANCER: ICD-10-CM

## 2023-06-27 DIAGNOSIS — Z80.9 FAMILY HISTORY OF CANCER: ICD-10-CM

## 2023-06-27 DIAGNOSIS — D12.6 TUBULOVILLOUS ADENOMA OF COLON: ICD-10-CM

## 2023-06-27 DIAGNOSIS — Z83.719 FAMILY HISTORY OF COLONIC POLYPS: ICD-10-CM

## 2023-06-27 LAB
GENETIC COUNSELING?: YES
GENSO SPECIMEN TYPE: NORMAL
MISCELLANEOUS GENETIC TEST NAME: NORMAL
PARTENTAL OR SIBLING TESTING?: NO
REFERENCE LAB: NORMAL
TEST RESULT: NORMAL

## 2023-06-27 PROCEDURE — 36415 COLL VENOUS BLD VENIPUNCTURE: CPT | Performed by: NURSE PRACTITIONER

## 2023-07-08 ENCOUNTER — TELEPHONE (OUTPATIENT)
Dept: ENDOSCOPY | Facility: HOSPITAL | Age: 48
End: 2023-07-08
Payer: COMMERCIAL

## 2023-07-08 ENCOUNTER — PATIENT MESSAGE (OUTPATIENT)
Dept: ENDOSCOPY | Facility: HOSPITAL | Age: 48
End: 2023-07-08
Payer: COMMERCIAL

## 2023-09-11 ENCOUNTER — PATIENT MESSAGE (OUTPATIENT)
Dept: HEMATOLOGY/ONCOLOGY | Facility: CLINIC | Age: 48
End: 2023-09-11
Payer: COMMERCIAL

## 2023-09-21 ENCOUNTER — OFFICE VISIT (OUTPATIENT)
Dept: GASTROENTEROLOGY | Facility: CLINIC | Age: 48
End: 2023-09-21
Payer: COMMERCIAL

## 2023-09-21 VITALS
BODY MASS INDEX: 26.89 KG/M2 | HEART RATE: 76 BPM | WEIGHT: 167.31 LBS | HEIGHT: 66 IN | DIASTOLIC BLOOD PRESSURE: 69 MMHG | SYSTOLIC BLOOD PRESSURE: 97 MMHG

## 2023-09-21 DIAGNOSIS — R10.13 EPIGASTRIC PAIN: Primary | ICD-10-CM

## 2023-09-21 DIAGNOSIS — Q99.8: Chronic | ICD-10-CM

## 2023-09-21 PROCEDURE — 3008F PR BODY MASS INDEX (BMI) DOCUMENTED: ICD-10-PCS | Mod: CPTII,S$GLB,, | Performed by: INTERNAL MEDICINE

## 2023-09-21 PROCEDURE — 1159F PR MEDICATION LIST DOCUMENTED IN MEDICAL RECORD: ICD-10-PCS | Mod: CPTII,S$GLB,, | Performed by: INTERNAL MEDICINE

## 2023-09-21 PROCEDURE — 99204 OFFICE O/P NEW MOD 45 MIN: CPT | Mod: S$GLB,,, | Performed by: INTERNAL MEDICINE

## 2023-09-21 PROCEDURE — 3078F DIAST BP <80 MM HG: CPT | Mod: CPTII,S$GLB,, | Performed by: INTERNAL MEDICINE

## 2023-09-21 PROCEDURE — 1160F RVW MEDS BY RX/DR IN RCRD: CPT | Mod: CPTII,S$GLB,, | Performed by: INTERNAL MEDICINE

## 2023-09-21 PROCEDURE — 3074F SYST BP LT 130 MM HG: CPT | Mod: CPTII,S$GLB,, | Performed by: INTERNAL MEDICINE

## 2023-09-21 PROCEDURE — 99999 PR PBB SHADOW E&M-EST. PATIENT-LVL III: ICD-10-PCS | Mod: PBBFAC,,, | Performed by: INTERNAL MEDICINE

## 2023-09-21 PROCEDURE — 3074F PR MOST RECENT SYSTOLIC BLOOD PRESSURE < 130 MM HG: ICD-10-PCS | Mod: CPTII,S$GLB,, | Performed by: INTERNAL MEDICINE

## 2023-09-21 PROCEDURE — 3078F PR MOST RECENT DIASTOLIC BLOOD PRESSURE < 80 MM HG: ICD-10-PCS | Mod: CPTII,S$GLB,, | Performed by: INTERNAL MEDICINE

## 2023-09-21 PROCEDURE — 99999 PR PBB SHADOW E&M-EST. PATIENT-LVL III: CPT | Mod: PBBFAC,,, | Performed by: INTERNAL MEDICINE

## 2023-09-21 PROCEDURE — 1160F PR REVIEW ALL MEDS BY PRESCRIBER/CLIN PHARMACIST DOCUMENTED: ICD-10-PCS | Mod: CPTII,S$GLB,, | Performed by: INTERNAL MEDICINE

## 2023-09-21 PROCEDURE — 3008F BODY MASS INDEX DOCD: CPT | Mod: CPTII,S$GLB,, | Performed by: INTERNAL MEDICINE

## 2023-09-21 PROCEDURE — 1159F MED LIST DOCD IN RCRD: CPT | Mod: CPTII,S$GLB,, | Performed by: INTERNAL MEDICINE

## 2023-09-21 PROCEDURE — 99204 PR OFFICE/OUTPT VISIT, NEW, LEVL IV, 45-59 MIN: ICD-10-PCS | Mod: S$GLB,,, | Performed by: INTERNAL MEDICINE

## 2023-09-21 RX ORDER — LEVOTHYROXINE SODIUM 88 UG/1
TABLET ORAL
COMMUNITY
Start: 2023-06-01

## 2023-09-21 NOTE — PROGRESS NOTES
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  She was recently discovered to have this gene alteration upon workup of abdominal discomfort pain family history of gastric cancer in a grandmother and a diagnosis of interstitial cystitis.  She is had no history of pancreatitis she does not drink she is not smoke she is up-to-date on colon cancer screening.  Her most recent colonoscopy she had a large approximately 2 cm pedunculated polyp that was removed a follow-up colonoscopy confirmed its extirpation.      OBJECTIVE:     Vital Signs (Most Recent)  Pulse: 76 (09/21/23 0823)  BP: 97/69 (09/21/23 0823)    General: well developed, well nourished    Diagnostic Results:  Records from prior visits reviewed       ASSESSMENT/PLAN:     We discussed pancreatic cancer screening given her particular gene alteration and she is interested in pursuing screening.  This will involve yearly pancreatic surveillance using either endoscopic ultrasound or MRI.  She is opted to start with an MRI aorta which I have placed today.  We also discussed that although screening mechanisms are in placed in attempts to detect pancreatic cancer early, pancreatic cancer screening in and of itself not been shown to reduce were improved mortality and morbidity from pancreatic cancer.  Regardless she is interested in proceeding.

## 2023-09-21 NOTE — PROGRESS NOTES
GENERAL GI PATIENT INTAKE:    COVID symptoms in the last 7 days (runny nose, sore throat, congestion, cough, fever): No  PCP: Curry Webber  If not PCP-  number given to establish [phone]: N/A    ALLERGIES REVIEWED:  Yes    CHIEF COMPLAINT:    Chief Complaint   Patient presents with    Initial Visit     gene mutation       VITAL SIGNS:  There were no vitals taken for this visit.     Change in medical, surgical, family or social history: No      REVIEWED MEDICATION LIST RECONCILED INCLUDING ABOVE MEDS:  Yes

## 2023-10-07 ENCOUNTER — HOSPITAL ENCOUNTER (OUTPATIENT)
Dept: RADIOLOGY | Facility: HOSPITAL | Age: 48
Discharge: HOME | End: 2023-10-07
Attending: INTERNAL MEDICINE
Payer: COMMERCIAL

## 2023-10-07 DIAGNOSIS — R10.13 EPIGASTRIC PAIN: ICD-10-CM

## 2023-10-07 PROCEDURE — 74183 MRI ABD W/O CNTR FLWD CNTR: CPT | Mod: TC

## 2023-10-07 PROCEDURE — 25500020 PHARM REV CODE 255: Performed by: INTERNAL MEDICINE

## 2023-10-07 PROCEDURE — A9585 GADOBUTROL INJECTION: HCPCS | Performed by: INTERNAL MEDICINE

## 2023-10-07 PROCEDURE — 74183 MRI ABD W/O CNTR FLWD CNTR: CPT | Mod: 26,,, | Performed by: RADIOLOGY

## 2023-10-07 PROCEDURE — 74183 MRI ABDOMEN W WO CONTRAST: ICD-10-PCS | Mod: 26,,, | Performed by: RADIOLOGY

## 2023-10-07 RX ORDER — GADOBUTROL 604.72 MG/ML
INJECTION INTRAVENOUS
Status: DISCONTINUED
Start: 2023-10-07 | End: 2023-10-08 | Stop reason: HOSPADM

## 2023-10-07 RX ORDER — GADOBUTROL 604.72 MG/ML
7 INJECTION INTRAVENOUS
Status: COMPLETED | OUTPATIENT
Start: 2023-10-07 | End: 2023-10-07

## 2023-10-07 RX ADMIN — GADOBUTROL 7 ML: 604.72 INJECTION INTRAVENOUS at 01:10

## 2023-10-09 ENCOUNTER — PATIENT MESSAGE (OUTPATIENT)
Dept: GASTROENTEROLOGY | Facility: HOSPITAL | Age: 48
End: 2023-10-09
Payer: COMMERCIAL

## 2024-03-28 ENCOUNTER — PATIENT MESSAGE (OUTPATIENT)
Dept: HEMATOLOGY/ONCOLOGY | Facility: CLINIC | Age: 49
End: 2024-03-28
Payer: COMMERCIAL

## 2024-08-16 ENCOUNTER — TELEPHONE (OUTPATIENT)
Dept: ENDOSCOPY | Facility: HOSPITAL | Age: 49
End: 2024-08-16
Payer: COMMERCIAL

## 2024-08-16 DIAGNOSIS — R93.89 ABNORMAL FINDING ON IMAGING: Primary | ICD-10-CM

## 2024-08-27 ENCOUNTER — PATIENT MESSAGE (OUTPATIENT)
Dept: GASTROENTEROLOGY | Facility: CLINIC | Age: 49
End: 2024-08-27
Payer: COMMERCIAL

## 2025-03-16 ENCOUNTER — PATIENT MESSAGE (OUTPATIENT)
Dept: GASTROENTEROLOGY | Facility: CLINIC | Age: 50
End: 2025-03-16
Payer: COMMERCIAL

## 2025-04-07 ENCOUNTER — PATIENT MESSAGE (OUTPATIENT)
Dept: ENDOSCOPY | Facility: HOSPITAL | Age: 50
End: 2025-04-07
Payer: COMMERCIAL